# Patient Record
Sex: FEMALE | URBAN - METROPOLITAN AREA
[De-identification: names, ages, dates, MRNs, and addresses within clinical notes are randomized per-mention and may not be internally consistent; named-entity substitution may affect disease eponyms.]

---

## 2019-07-24 ENCOUNTER — APPOINTMENT (RX ONLY)
Dept: URBAN - METROPOLITAN AREA CLINIC 12 | Facility: CLINIC | Age: 49
Setting detail: DERMATOLOGY
End: 2019-07-24

## 2019-07-24 DIAGNOSIS — T85.898 OTHER SPECIFIED COMPLICATION OF OTHER INTERNAL PROSTHETIC DEVICES, IMPLANTS AND GRAFTS: ICD-10-CM

## 2019-07-24 DIAGNOSIS — Z41.1 ENCOUNTER FOR COSMETIC SURGERY: ICD-10-CM

## 2019-07-24 PROBLEM — T85.89XD: Status: ACTIVE | Noted: 2019-07-24

## 2019-07-24 PROCEDURE — 99203 OFFICE O/P NEW LOW 30 MIN: CPT

## 2019-07-24 PROCEDURE — ? PATIENT SPECIFIC COUNSELING

## 2019-07-24 PROCEDURE — ? COUNSELING - PROSTHESIS

## 2019-07-24 PROCEDURE — ? PHOTOS OBTAINED

## 2019-07-24 ASSESSMENT — LOCATION DETAILED DESCRIPTION DERM
LOCATION DETAILED: LEFT MEDIAL BREAST 6-7:00 REGION
LOCATION DETAILED: RIGHT MEDIAL BREAST 5-6:00 REGION

## 2019-07-24 ASSESSMENT — LOCATION SIMPLE DESCRIPTION DERM
LOCATION SIMPLE: RIGHT BREAST
LOCATION SIMPLE: LEFT BREAST

## 2019-07-24 ASSESSMENT — LOCATION ZONE DERM: LOCATION ZONE: TRUNK

## 2019-07-24 NOTE — PROCEDURE: PATIENT SPECIFIC COUNSELING
Other (Free Text): Bilateral grade 3 cap \\nPalp. Ripple on the right \\nImplant thinning the skin\\n\\nSimilar finding on the left but not as severe. \\n\\nPatient presents with concerns with her breast implants patient states that she had a double mastectomy in January of 2016 and implants placed the following July of 2016. Patient states that her implant seems to have fallen out of place to the point that she can feel them through her skin. Dr. Quintero examined her breast and and explained that he does feel the edge of implant in her R breast, which Dr. Quintero explained can cause damage to her skin if the implants are not changed at some point. Dr. Quintero explained to fix the issue is to simply remove the implant, core out the scar tissue, and replace the implant  from textured to smooth. Patient was informed that the procedure will be done in the or under general anesthesia. \\n\\Mariama Quintero explained that he will need to go over her previous op notes.  Patient verbalized understanding. \\nPatients photos were taken
Detail Level: Zone

## 2019-07-24 NOTE — HPI: BREAST (DEFORMITY, BREAST)
Which Breast(S) Are You Concerned About?: bilateral breasts
Do You Prefer One Side's Appearance Over The Other?: the right breast
Which Side(S)?: both breasts
How Severe Are Your Concerns?: moderate
Is This A New Presentation, Or A Follow-Up?: Breast Deformity

## 2019-07-24 NOTE — PROCEDURE: PHOTOS OBTAINED
Photographed Locations: Photos were obtained of the surgical site(s).
Follow-Up For Additional Photos?: no
Detail Level: Detailed